# Patient Record
(demographics unavailable — no encounter records)

---

## 2022-06-30 RX ORDER — NORGESTREL AND ETHINYL ESTRADIOL 0.3-0.03MG
KIT ORAL
COMMUNITY
End: 2022-09-27

## 2022-06-30 RX ORDER — METRONIDAZOLE 500 MG/1
TABLET ORAL
COMMUNITY
End: 2022-09-27

## 2022-06-30 RX ORDER — TRAMADOL HYDROCHLORIDE 50 MG/1
TABLET ORAL
COMMUNITY
End: 2022-09-27

## 2022-07-28 LAB
ANION GAP SERPL CALC-SCNC: 10 MMOL/L (ref 2–17)
APPEARANCE UR: CLEAR
BACTERIA, URINE: ABNORMAL
BASOPHILS # BLD AUTO: 0 X10E3/MCL (ref 0–0.2)
BASOPHILS NFR BLD AUTO: 0.3 % (ref 0–2)
BILIRUB UR STRIP.AUTO-MCNC: NEGATIVE MG/DL
BUN SERPL-MCNC: 11 MG/DL (ref 6–20)
CALCIUM SERPL-MCNC: 9 MG/DL (ref 8.6–10)
CHLORIDE SERPL-SCNC: 104 MMOL/L (ref 98–107)
COLOR UR: YELLOW
CREAT SERPL-MCNC: 0.8 MG/DL (ref 0.5–1)
DEPRECATED HCO3 PLAS-SCNC: 24 MMOL/L (ref 22–29)
EOSINOPHIL # BLD AUTO: 0.1 X10E3/MCL (ref 0–0.5)
EOSINOPHIL NFR BLD AUTO: 1.2 % (ref 0–7)
ERYTHROCYTE [DISTWIDTH] IN BLOOD BY AUTOMATED COUNT: 19.3 % (ref 11–16)
GFR SERPL CREATININE-BSD FRML MDRD: 95 ML/MIN/1.73M²
GLUCOSE SERPL-MCNC: 109 MG/DL (ref 70–99)
GLUCOSE UR STRIP.AUTO-MCNC: NEGATIVE MG/DL
HCT VFR BLD AUTO: 28.7 % (ref 34–47)
HGB BLD-MCNC: 8.8 G/DL (ref 11.5–15.7)
IMMATURE GRANS (ABS): 0.02 X10E3/MCL (ref 0–0.06)
IMMATURE GRANULOCYTES: 0.2 % (ref 0–0.6)
KETONES UR STRIP.AUTO-MCNC: NEGATIVE MG/DL
LEUKOCYTE ESTERASE UR QL STRIP: ABNORMAL
LYMPHOCYTES # SPEC AUTO: 3.7 X10E3/MCL (ref 1–3.2)
LYMPHOCYTES NFR BLD AUTO: 33.1 % (ref 15–45)
MAGNESIUM SERPL-MCNC: 2.1 MG/DL (ref 1.6–2.6)
MCH RBC QN AUTO: 21 PG (ref 27–34.5)
MCHC RBC AUTO-ENTMCNC: 30.7 G/DL (ref 32–36)
MCV RBC AUTO: 68.5 FL (ref 81–99)
MONOCYTES # BLD AUTO: 1 X10E3/MCL (ref 0.3–1)
MONOCYTES NFR BLD AUTO: 8.5 % (ref 4–12)
MUCUS, URINE: ABNORMAL /LPF
NEUTROPHILS # BLD AUTO: 6.3 X10E3/MCL (ref 1.6–7.3)
NEUTROPHILS NFR BLD AUTO: 56.7 % (ref 42–74)
NITRITE UR QL STRIP.AUTO: NEGATIVE
OSMOLALITY SERPL CALC.SUM OF ELEC: 275 MOSM/KG (ref 270–287)
PH UR STRIP.AUTO: 6 [PH] (ref 4.5–8)
PLATELET # BLD AUTO: 517 X10E3/MCL (ref 140–440)
PMV BLD AUTO: 9.4 FL (ref 7.2–13.2)
POTASSIUM SERPL-SCNC: 3.7 MMOL/L (ref 3.5–5.3)
PROT UR QL STRIP: NEGATIVE
RBC # BLD AUTO: 4.19 X10E6/MCL (ref 3.6–5.2)
RBC # UR STRIP: NEGATIVE /UL
RBC URINE: ABNORMAL /HPF (ref 0–2)
SODIUM SERPL-SCNC: 138 MMOL/L (ref 135–145)
SP GR UR STRIP: >=1.03 (ref 1–1.03)
SQUAMOUS EPITHELIAL: ABNORMAL /LPF
UROBILINOGEN UR STRIP-MCNC: 1 EU/DL
WBC # BLD AUTO: 11.1 X10E3/MCL (ref 3.8–10.6)
WBC URINE: ABNORMAL /HPF (ref 0–2)

## 2022-07-30 LAB
FINAL REPORT: NORMAL
ORGANISM: NORMAL

## 2022-09-27 PROBLEM — R87.810 CERVICAL HIGH RISK HPV (HUMAN PAPILLOMAVIRUS) TEST POSITIVE: Status: ACTIVE | Noted: 2022-09-27

## 2022-09-27 PROBLEM — R20.0 ANESTHESIA OF SKIN: Status: ACTIVE | Noted: 2022-09-27

## 2022-09-27 PROBLEM — R20.2 PARESTHESIA OF SKIN: Status: ACTIVE | Noted: 2022-09-27

## 2022-09-27 PROBLEM — N93.9 ABNORMAL UTERINE BLEEDING: Status: ACTIVE | Noted: 2022-09-27

## 2022-09-27 PROBLEM — N15.9 INFECTION OF KIDNEY: Status: ACTIVE | Noted: 2022-09-27

## 2022-09-27 PROBLEM — R20.2 PARESTHESIA OF HAND, BILATERAL: Status: ACTIVE | Noted: 2022-09-27

## 2022-09-27 PROBLEM — N92.0 MENORRHAGIA WITH REGULAR CYCLE: Status: ACTIVE | Noted: 2022-09-27

## 2022-09-27 PROBLEM — G56.02 LEFT CARPAL TUNNEL SYNDROME: Status: ACTIVE | Noted: 2022-09-27

## 2022-09-27 PROBLEM — G56.01 RIGHT CARPAL TUNNEL SYNDROME: Status: ACTIVE | Noted: 2022-09-27

## 2022-09-27 PROBLEM — G56.21 ULNAR NEUROPATHY AT ELBOW OF RIGHT UPPER EXTREMITY: Status: ACTIVE | Noted: 2022-09-27

## 2022-10-23 NOTE — ED PROVIDER NOTES
General Medical Problem *ED        Patient:   Abundio Houston            MRN: [id number]            FIN: [id number]               Age:   36 years     Sex:  Female     :  1981   Associated Diagnoses:   Carpal tunnel syndrome, bilateral; Pain of left calf; Anemia   Author:   Vipul Pretty      Basic Information   Time seen: Provider Seen (ST)   ED Provider/Time:    Vipul Pretty / 2022 19:21  . Additional information: Chief Complaint from Nursing Triage Note   Chief Complaint  Chief Complaint: Pt c/o bilateral arm and leg swelling for past months, however worse now. Pt also c/o right breast pain, endorses swelling. Pt also c/o right middle finger pain since yesterday. Also reports frequent urination and back pain. Denies N/V/D, fevers/chills. (22 18:58:00). History of Present Illness   The patient presents with 3year-old -American female with a past medical history of bilateral carpal tunnel with concomitant peripheral neuropathy and follows with Dr. Naseem Powell coming in with left lower calf pain and swelling for the past 3 days after she landed from a plane flight from [place]. Month ago she was [facility] and had the same pain in her left calf. She denies chest pain, shortness of breath. No history of blood clots. She also notes urinary frequency with no dysuria that has been going on for the past 1-2 days. No fevers, back pain, flank pain. No trauma no falls with her back pain. .        Review of Systems   Constitutional symptoms:  No fever, no chills, no sweats, no fatigue. Skin symptoms:  No jaundice, no rash. Eye symptoms:  Vision unchanged. Respiratory symptoms:  No shortness of breath, no cough. Cardiovascular symptoms:  No chest pain, no palpitations, no peripheral edema. Gastrointestinal symptoms:  No nausea, no vomiting, no diarrhea. Genitourinary symptoms:  Urinary frequency. Musculoskeletal symptoms:  Muscle pain, Joint pain.     Neurologic symptoms:  Numbness, tingling, no headache, no focal weakness. Hematologic/Lymphatic symptoms:  Bleeding tendency negative,    Allergy/immunologic symptoms:  No impaired immunity,              Additional review of systems information: All other systems reviewed and otherwise negative. Health Status   Allergies: Allergic Reactions (Selected)  No Known Allergies. Past Medical/ Family/ Social History   Surgical history: Reviewed in chart and/or reviewed at bedside. .   Family history: Reviewed in chart and/or reviewed at bedside. .   Social history: Reviewed in chart and/or reviewed at bedside. .   Problem list:    No qualifying data available  , Reviewed in chart and/or reviewed at bedside. Robi Contreras Physical Examination               Vital Signs   Vital Signs   7/28/2022 19:37 EDT Respiratory Rate 16 br/min   6/70/3687 91:85 EDT Systolic Blood Pressure 579 mmHg  HI    Diastolic Blood Pressure 98 mmHg  HI    Temperature Oral 36.7 degC    Heart Rate Monitored 101 bpm  HI    Respiratory Rate 16 br/min    SpO2 100 %   . Measurements   7/28/2022 19:04 EDT Body Mass Index est raúl 37.68 kg/m2    Body Mass Index Measured 37.68 kg/m2   7/28/2022 18:58 EDT Height/Length Measured 163 cm    Weight Dosing 100.1 kg   . Oxygen saturation. General:  Alert, no acute distress. Skin:  Warm, dry, pink. Head:  Normocephalic, atraumatic. Neck:  Supple, trachea midline. Eye:  Extraocular movements are intact, normal conjunctiva, vision grossly normal.    Ears, nose, mouth and throat:  Oral mucosa moist.   Cardiovascular:  Regular rate and rhythm, No murmur, Normal peripheral perfusion. Respiratory:  Lungs are clear to auscultation, respirations are non-labored, breath sounds are equal, Symmetrical chest wall expansion. Back:  Normal range of motion.    Musculoskeletal:  Bilateral phalen and tinel sign to both wrists  Left lower extremity: Positive Homans' sign and tenderness to palpation of the left Discharged: to home. Prescriptions: Launch prescriptions   Pharmacy:  Keflex 500 mg oral capsule (Prescribe): 500 mg, 1 caps, Oral, BID, for 5 days, 10 caps, 0 Refill(s), Launch prescriptions   Pharmacy:  Diflucan 150 mg oral tablet (Prescribe): 150 mg, 1 tabs, Oral, qWeek, for 2 weeks, 2 tabs, 0 Refill(s). Patient was given the following educational materials: Carpal Tunnel Syndrome. Follow up with: [facility] Within 1 week; Follow up with primary care provider Within 1 week Please follow-up with her primary care provider and/or if you do not have one please call [phone] for a referral or see attached clinic list. - TO RECHECK YOUR HGB AS IT WAS 8.8 HERE IN ED. Counseled: Patient, Regarding diagnosis, Regarding diagnostic results, Regarding treatment plan, Regarding prescription, Patient indicated understanding of instructions.     Signature Line     Electronically Signed on 07/28/2022 08:46 PM EDT   ________________________________________________   Aida Dugan      Electronically Signed on 07/28/2022 10:44 PM EDT   ________________________________________________   Allegra Cheng            Modified by: Aida Dugan on 07/28/2022 08:35 PM EDT      Modified by: Aida Dugan on 07/28/2022 08:17 PM EDT      Modified by: Aida Dugan on 07/28/2022 08:43 PM EDT      Modified by: Aida Dugan on 07/28/2022 08:46 PM EDT

## 2022-10-23 NOTE — ED NOTES
ED Patient Education Note     Patient Education Materials Follows:  Orthopedics     Carpal Tunnel Syndrome      Carpal tunnel syndrome is a condition that causes pain, numbness, and weakness in your hand and fingers. The carpal tunnel is a narrow area located on the palm side of your wrist. Repeated wrist motion or certain diseases may cause swelling within the tunnel. This swelling pinches the main nerve in the wrist. The main nerve in the wrist is called the median nerve. What are the causes? This condition may be caused by:   Repeated and forceful wrist and hand motions. Wrist injuries. Arthritis. A cyst or tumor in the carpal tunnel. Fluid buildup during pregnancy. Use of tools that vibrate. Sometimes the cause of this condition is not known. What increases the risk? The following factors may make you more likely to develop this condition:   Having a job that requires you to repeatedly or forcefully move your wrist or hand or requires you to use tools that vibrate. This may include jobs that involve using computers, working on an [employer], or working with [facility] [address] such as drills or mariano. Being a woman. Having certain conditions, such as:  ? Diabetes. ? Obesity. ? An underactive thyroid (hypothyroidism). ? Kidney failure. ? Rheumatoid arthritis. What are the signs or symptoms? Symptoms of this condition include:   A tingling feeling in your fingers, especially in your thumb, index, and middle fingers. Tingling or numbness in your hand. An aching feeling in your entire arm, especially when your wrist and elbow are bent for a long time. Wrist pain that goes up your arm to your shoulder. Pain that goes down into your palm or fingers. A weak feeling in your hands. You may have trouble grabbing and holding items. Your symptoms may feel worse during the night. How is this diagnosed?     This condition is diagnosed with a medical history and physical exam. You may also have tests, including:   Electromyogram (EMG). This test measures electrical signals sent by your nerves into the muscles. Nerve conduction study. This test measures how well electrical signals pass through your nerves. Imaging tests, such as X-rays, ultrasound, and MRI. These tests check for possible causes of your condition. How is this treated? This condition may be treated with:   Lifestyle changes. It is important to stop or change the activity that caused your condition. Doing exercise and activities to strengthen and stretch your muscles and tendons (physical therapy). Making lifestyle changes to help with your condition and learning how to do your daily activities safely (occupational therapy). Medicines for pain and inflammation. This may include medicine that is injected into your wrist.     A wrist splint or brace. Surgery. Follow these instructions at home:    If you have a splint or brace:     Wear the splint or brace as told by your health care provider. Remove it only as told by your health care provider. Loosen the splint or brace if your fingers tingle, become numb, or turn cold and blue. Keep the splint or brace clean. If the splint or brace is not waterproof:  ? Do not let it get wet. ? Cover it with a watertight covering when you take a bath or shower. Managing pain, stiffness, and swelling      If directed, put ice on the painful area. To do this:   If you have a removeable splint or brace, remove it as told by your health care provider. Put ice in a plastic bag. Place a towel between your skin and the bag or between the splint or brace and the bag. Leave the ice on for 20 minutes, 2?3 times a day. Do not fall asleep with the cold pack on your skin. Remove the ice if your skin turns bright red. This is very important.  If you cannot feel pain, heat, or cold, you have a greater risk of damage to the area. Move your fingers often to reduce stiffness and swelling. General instructions     Take over-the-counter and prescription medicines only as told by your health care provider. Rest your wrist and hand from any activity that may be causing your pain. If your condition is work related, talk with your employer about changes that can be made, such as getting a wrist pad to use while typing. Do any exercises as told by your health care provider, physical therapist, or occupational therapist.     Candance Raider all follow-up visits. This is important. Contact a health care provider if:     You have new symptoms. Your pain is not controlled with medicines. Your symptoms get worse. Get help right away if:     You have severe numbness or tingling in your wrist or hand. Summary     Carpal tunnel syndrome is a condition that causes pain, numbness, and weakness in your hand and fingers. It is usually caused by repeated wrist motions. Lifestyle changes and medicines are used to treat carpal tunnel syndrome. Surgery may be recommended. Follow your health care provider's instructions about wearing a splint, resting from activity, keeping follow-up visits, and calling for help. This information is not intended to replace advice given to you by your health care provider. Make sure you discuss any questions you have with your health care provider. Document Revised: 04/29/2021 Document Reviewed: 04/29/2021  [employer] Patient Education ?  [address] [place].

## 2022-10-23 NOTE — ED NOTES
ED Triage Note       ED Secondary Triage Entered On:  7/28/2022 19:24 EDT    Performed On:  7/28/2022 19:23 EDT by Jacob Villar               General Information   Barriers to Learning :   None evident   ED Home Meds Section :   Document assessment   [facility] ED Fall Risk Section :   Document assessment   ED Advance Directives Section :   Document assessment   ED Palliative Screen :   N/A (prefilled for <66yo)   Jacob Villar - 7/28/2022 19:23 EDT   (As Of: 7/28/2022 19:24:14 EDT)   Diagnoses(Active)    Multiple complaints  Date:   7/28/2022 ; Diagnosis Type:   Reason For Visit ; Confirmation:   Complaint of ; Clinical Dx:   Multiple complaints ; Classification:   Medical ; Clinical Service:   Emergency medicine ; Code:   PNED ; Probability:   0 ; Diagnosis Code:   [id number]             -    Procedure History   (As Of: 7/28/2022 19:24:14 EDT)     [facility] Fall Risk Assessment Tool   Hx of falling last 3 months ED Fall :   No   Jacob Villar - 7/28/2022 19:23 EDT   ED Advance Directive   Advance Directive :   No   Jacob Villar - 7/28/2022 19:23 EDT   Med Hx   Medication List   (As Of: 7/28/2022 19:24:14 EDT)

## 2022-10-23 NOTE — ED NOTES
Of: 7/28/2022 19:04:52 EDT)   Allergies (Active)   No Known Allergies  Estimated Onset Date:   Unspecified ; Created By:   Opal Garay RN, Aida Merino; Reaction Status:   Active ; Category:   Drug ; Substance:   No Known Allergies ; Type:    Allergy ; Updated By:   Opal Garay RN, Aida Merino; Reviewed Date:   7/28/2022 19:02 EDT        Psycho-Social   Last 3 mo, thoughts killing self/others :   Patient denies   Right click within box for Suspected Abuse policy link. :   None   Feels Safe Where Live :   Yes   ED Behavioral Activity Rating Scale :   4 - Quiet and awake (normal level of activity)   Opal Garay RN, Aida Merino - 7/28/2022 18:58 EDT   ED Reason for Visit   (As Of: 7/28/2022 19:04:52 EDT)   Diagnoses(Active)    Multiple complaints  Date:   7/28/2022 ; Diagnosis Type:   Reason For Visit ; Confirmation:   Complaint of ; Clinical Dx:   Multiple complaints ; Classification:   Medical ; Clinical Service:   Emergency medicine ; Code:   PNED ; Probability:   0 ; Diagnosis Code:   [id number]

## 2022-10-23 NOTE — DISCHARGE SUMMARY
ED Clinical Summary                         [facility]  [address], [address] [phone]           PERSON INFORMATION  Name: Aguila Montaño Age:  36 Years : 1981   Sex: Female Language: English PCP: PCP,  NONE   Marital Status:   Phone: [phone] Med Service: MED-Medicine   MRN:  [id number] Acct# [de-identified] Arrival: 2022 18:55:00   Visit Reason: Multiple complaints; MUTILPLE COMPLAINTS Acuity: 3 LOS: 000 04:58   Address:      [address] [place] [address]  Diagnosis:      Anemia; Carpal tunnel syndrome, bilateral; Pain of left calf  Printed Prescriptions: Allergies      No Known Allergies      Medications Administered During Visit:        Patient Medication List:              cephalexin (Keflex 500 mg oral capsule) 1 Capsules Oral (given by mouth) 2 times a day for 5 Days. Refills: 0.  fluconazole (Diflucan 150 mg oral tablet) 1 Tabs Oral (given by mouth) every week for 2 Weeks. Refills: 0. Major Tests and Procedures: The following procedures and tests were performed during your ED visit. COMMONPROCEDURES%>  COMMON PROCEDURESCOMMENTS%>          Laboratory Orders  Name Status Details   . UA Micro Completed Urine, Clean Catch, Stat, ST - Stat, Collected, 22 19:51:00 EDT, Once, 22 19:51:00 EDT, Nurse collect, 22 19:51:00 EDT, Leonidas Avendaño, Print label Y/N, [id number]   BMP Completed Blood, Stat, ST - Stat, 22 20:07:00 EDT, 22 20:07:00 EDT, Nurse collect, Leonidas Avendaño, Print label Y/N   C Urine Ordered Urine, Clean Catch, Stat, ST - Stat, 22 19:40:00 EDT, 22 19:40:00 EDT, Nurse collect   CBCDIFF Completed Blood, Stat, ST - Stat, 22 19:40:00 EDT, 22 19:40:00 EDT, Nurse collectLeonidas, Print label Y/N   Mg Completed Blood, Stat, ST - Stat, 22 20:07:00 EDT, 22 20:07:00 EDT, Nurse collect, Cosme Saunders, MANI-PAC, Print label Y/N   UA Rflx Tre Completed Urine, Clean Catch, Stat, ST - Stat, 07/28/22 19:40:00 EDT, Once, 07/28/22 19:40:00 EDT, Nurse collectPablo, Print label Y/N               Radiology Orders  No radiology orders were placed.               Patient Care Orders  Name Status Details   Discharge Patient Ordered 07/28/22 20:36:00 EDT   ED Assessment Adult Completed 07/28/22 19:04:53 EDT, 07/28/22 19:04:53 EDT   ED Secondary Triage Completed 07/28/22 19:04:53 EDT, 07/28/22 19:04:53 EDT   ED Triage Adult Completed 07/28/22 18:55:46 EDT, 07/28/22 18:55:46 EDT             PROVIDER INFORMATION               Provider Role Assigned Rishi [facility] - INPATIENT ED MidLevel 7/28/2022 19:21:47    Gabriele Mclaughlin ED Nurse 7/28/2022 19:23:00        Attending Physician:  Melisa Bates Doc  REEVEGIOVANNY     Consulting Doc       VITALS INFORMATION  Vital Sign Triage Latest   Temp Oral ORAL_1%>36.7 degC ORAL%>36.7 degC   Temp Temporal TEMPORAL_1%> TEMPORAL%>   Temp Intravascular INTRAVASCULAR_1%> INTRAVASCULAR%>   Temp Axillary AXILLARY_1%> AXILLARY%>   Temp Rectal RECTAL_1%> RECTAL%>   02 Sat 100 % 100 %   Respiratory Rate RATE_1%>16 br/min RATE%>16 br/min   Peripheral Pulse Rate PULSE RATE_1%> PULSE RATE%>   Apical Heart Rate HEART RATE_1%> HEART RATE%>   Blood Pressure BLOOD PRESSURE_1%>/ BLOOD PRESSURE_1%>98 mmHg BLOOD PRESSURE%>139 mmHg / BLOOD PRESSURE%>82 mmHg                 Immunizations      No Immunizations Documented This Visit          DISCHARGE INFORMATION   Discharge Disposition: H Outpt-Sent Home   Discharge Location:    Home   Discharge Date and Time:    7/28/2022 23:53:04   ED Checkout Date and Time:    7/28/2022 23:53:04     DEPART REASON INCOMPLETE INFORMATION               Depart Action Incomplete Reason   Interactive View/I&O Recently assessed               Problems      No Problems Documented              Smoking Status      No Smoking Status Documented         PATIENT EDUCATION INFORMATION  Instructions:       Carpal Tunnel Syndrome     Follow up:                    With: Address: When:   Follow up with primary care provider  Within 1 week   Comments:   Please follow-up with her primary care provider and/or if you do not have one please call [phone] for a referral or see attached clinic list. - TO RECHECK YOUR HGB AS IT WAS 8.8 HERE IN ED       With: Address: When:   FRANKIE JASMYNE [facility] [address] [place] [address] [address] [place] [address], [place] [address], [address] [place] [address]  [phone] Business (1) Within 1 week           ED PROVIDER DOCUMENTATION     Patient:   Fracisco Broussard            MRN: [id number]            FIN: [id number]               Age:   [address] years     Sex:  Female     :  1981   Associated Diagnoses:   Carpal tunnel syndrome, bilateral; Pain of left calf; Anemia   Author:   Jean Claude Ayala      Basic Information   Time seen: Provider Seen (ST)   ED Provider/Time:    Jean Claude Ayala / 2022 19:21  . Additional information: Chief Complaint from Nursing Triage Note   Chief Complaint  Chief Complaint: Pt c/o bilateral arm and leg swelling for past months, however worse now. Pt also c/o right breast pain, endorses swelling. Pt also c/o right middle finger pain since yesterday. Also reports frequent urination and back pain. Denies N/V/D, fevers/chills. (22 18:58:00). History of Present Illness   The patient presents with 3year-old -American female with a past medical history of bilateral carpal tunnel with concomitant peripheral neuropathy and follows with Dr. Luke Mcpherson coming in with left lower calf pain and swelling for the past 3 days after she landed from a plane flight from [place]. Month ago she was [facility] and had the same pain in her left calf. She denies chest pain, shortness of breath. No history of blood clots. She also notes urinary frequency with no dysuria that has been going on for the past 1-2 days.   No fevers, back pain, flank pain. No trauma no falls with her back pain. .        Review of Systems   Constitutional symptoms:  No fever, no chills, no sweats, no fatigue. Skin symptoms:  No jaundice, no rash. Eye symptoms:  Vision unchanged. Respiratory symptoms:  No shortness of breath, no cough. Cardiovascular symptoms:  No chest pain, no palpitations, no peripheral edema. Gastrointestinal symptoms:  No nausea, no vomiting, no diarrhea. Genitourinary symptoms:  Urinary frequency. Musculoskeletal symptoms:  Muscle pain, Joint pain. Neurologic symptoms:  Numbness, tingling, no headache, no focal weakness. Hematologic/Lymphatic symptoms:  Bleeding tendency negative,    Allergy/immunologic symptoms:  No impaired immunity,              Additional review of systems information: All other systems reviewed and otherwise negative. Health Status   Allergies: Allergic Reactions (Selected)  No Known Allergies. Past Medical/ Family/ Social History   Surgical history: Reviewed in chart and/or reviewed at bedside. .   Family history: Reviewed in chart and/or reviewed at bedside. .   Social history: Reviewed in chart and/or reviewed at bedside. .   Problem list:    No qualifying data available  , Reviewed in chart and/or reviewed at bedside. Robi Contreras Physical Examination               Vital Signs   Vital Signs   7/28/2022 19:37 EDT Respiratory Rate 16 br/min   1/93/4514 73:29 EDT Systolic Blood Pressure 721 mmHg  HI    Diastolic Blood Pressure 98 mmHg  HI    Temperature Oral 36.7 degC    Heart Rate Monitored 101 bpm  HI    Respiratory Rate 16 br/min    SpO2 100 %   . Measurements   7/28/2022 19:04 EDT Body Mass Index est raúl 37.68 kg/m2    Body Mass Index Measured 37.68 kg/m2   7/28/2022 18:58 EDT Height/Length Measured 163 cm    Weight Dosing 100.1 kg   . Oxygen saturation. General:  Alert, no acute distress. Skin:  Warm, dry, pink. Head:  Normocephalic, atraumatic.     Neck:  Supple, trachea midline. Eye:  Extraocular movements are intact, normal conjunctiva, vision grossly normal.    Ears, nose, mouth and throat:  Oral mucosa moist.   Cardiovascular:  Regular rate and rhythm, No murmur, Normal peripheral perfusion. Respiratory:  Lungs are clear to auscultation, respirations are non-labored, breath sounds are equal, Symmetrical chest wall expansion. Back:  Normal range of motion. Musculoskeletal:  Bilateral phalen and tinel sign to both wrists  Left lower extremity: Positive Homans' sign and tenderness to palpation of the left calf with no obvious redness, no unilateral swelling. Chest wall   Gastrointestinal:  Soft, Nontender, Non distended. Genitourinary   Neurological:  Alert and oriented to person, place, time, and situation, normal speech observed. Lymphatics   Psychiatric:  Cooperative, appropriate mood & affect. Medical Decision Making   Differential Diagnosis:  Abdominal pain, dehydration, electrolyte imbalance. Rationale:  49-year-old -American female coming in with a left calf pain and swelling that started 3 days ago after she got off a plane from [place] with no chest pain or shortness of breath nor history of DVTs and urinary frequency for 1 to 2 days with no other symptomology. We will send off urine, basic labs for the other muscle spasms/cramps otherwise she has a known history of neuropathy from her bilateral carpal tunnel and follows with Dr. Naseem Powell. Labs are otherwise reassuring other than a hemoglobin 8.8. She states that she supposed be taking iron pills but she has not taken them in awhile. She states that she has heavy menstrual cycles and supposed to scheduling her hysterectomy but has not. No active bleeding at this time. Denies melena, hematochezia. she states that her last menstrual cycle was only 5 Diflucan per patient request that she does get yeast infections days in length. I told her to start taking her iron pills as prescribed. Otherwise no electrolyte abnormalities. Urine shows mild urinary tract infection for which I will treat. Urine culture sent. Told to come back tomorrow for DVT ultrasound of her left lower extremity. Again no chest pain or shortness of breath at this time. .      Impression and Plan   Diagnosis   Carpal tunnel syndrome, bilateral (YZA46-UJ G56.03, Discharge, Medical)   Pain of left calf (CAY31-IH M79.662, Discharge, Medical)   Anemia (OIV58-KW D64.9, Discharge, Medical)   Plan   Condition: Improved, Stable. Disposition: Medically cleared, Discharged: to home. Prescriptions: Launch prescriptions   Pharmacy:  Keflex 500 mg oral capsule (Prescribe): 500 mg, 1 caps, Oral, BID, for 5 days, 10 caps, 0 Refill(s), Launch prescriptions   Pharmacy:  Diflucan 150 mg oral tablet (Prescribe): 150 mg, 1 tabs, Oral, qWeek, for 2 weeks, 2 tabs, 0 Refill(s). Patient was given the following educational materials: Carpal Tunnel Syndrome. Follow up with: Tita Davis Within 1 week; Follow up with primary care provider Within 1 week Please follow-up with her primary care provider and/or if you do not have one please call [phone] for a referral or see attached clinic list. - TO RECHECK YOUR HGB AS IT WAS 8.8 HERE IN ED. Counseled: Patient, Regarding diagnosis, Regarding diagnostic results, Regarding treatment plan, Regarding prescription, Patient indicated understanding of instructions.

## 2022-10-23 NOTE — ED NOTES
ED Patient Summary       ;          [facility] [place]  [address], [place], [address] [place] [address]  [phone]  Discharge Instructions (Patient)  Bro Soto  :  1981                   MRN: [id number]                   FIN: [id number]  Reason For Visit: Multiple complaints; Tiney Effie COMPLAINTS  Final Diagnosis: Anemia; Carpal tunnel syndrome, bilateral; Pain of left calf     Visit Date: 2022 18:55:00  Address: [place] [address] [address]  Phone: [phone]     Emergency Department Providers:         Primary Physician:      Juanjose Ann Dr would like to thank you for allowing us to assist you with your healthcare needs. The following includes patient education materials and information regarding your injury/illness. Follow-up Instructions: You were seen today on an emergency basis. Please contact your primary care doctor for a follow up appointment. If you received a referral to a specialist doctor, it is important you follow-up as instructed. It is important that you call your follow-up doctor to schedule and confirm the location of your next appointment. Your doctor may practice at multiple locations. The office location of your follow-up appointment may be different to the one written on your discharge instructions. If you do not have a primary care doctor, please call [phone] for help in finding a [facility] Provider. For help in finding a specialist doctor, please call [phone]. If your condition gets worse before your follow-up with your primary care doctor or specialist, please return to the Emergency Department. Coronavirus 2019 (COVID-19) Reminders:     Patients age 15 - 24, with parental consent, and over age 25 can make an appointment for a COVID-19 vaccine.  Patients can contact their [facility] doctors' offices to schedule an appointment to receive the COVID-19 vaccine. Patients who do not have a Kylah Mckeon physician can call [phone] to schedule vaccination appointments. Follow Up Appointments:  Primary Care Provider:     Name: PCP,  NONE     Phone:                  With: Address: When:   Follow up with primary care provider  Within 1 week   Comments:   Please follow-up with her primary care provider and/or if you do not have one please call [phone] for a referral or see attached clinic list. - TO RECHECK YOUR HGB AS IT WAS 8.8 HERE IN ED       With: Address: When:   [facility] [address] [facility], [address]  [phone] Business (1) Within 1 week              [address]  SERVICES%>             New Medications  Printed Prescriptions  cephalexin (Keflex 500 mg oral capsule) 1 Capsules Oral (given by mouth) 2 times a day for 5 Days. Refills: 0. Last Dose:____________________  fluconazole (Diflucan 150 mg oral tablet) 1 Tabs Oral (given by mouth) every week for 2 Weeks. Refills: 0. Last Dose:____________________      Allergy Info: No Known Allergies     Discharge Additional Information          Discharge Patient 07/28/22 20:36:00 EDT      Patient Education Materials:        Carpal Tunnel Syndrome      Carpal tunnel syndrome is a condition that causes pain, numbness, and weakness in your hand and fingers. The carpal tunnel is a narrow area located on the palm side of your wrist. Repeated wrist motion or certain diseases may cause swelling within the tunnel. This swelling pinches the main nerve in the wrist. The main nerve in the wrist is called the median nerve. What are the causes? This condition may be caused by:   Repeated and forceful wrist and hand motions. Wrist injuries. Arthritis. A cyst or tumor in the carpal tunnel. Fluid buildup during pregnancy. Use of tools that vibrate. Sometimes the cause of this condition is not known. What increases the risk? The following factors may make you more likely to develop this condition:   Having a job that requires you to repeatedly or forcefully move your wrist or hand or requires you to use tools that vibrate. This may include jobs that involve using computers, working on an [employer], or working with [address] [place] [address] [place] such as drills or mariano. Being a woman. Having certain conditions, such as:  ? Diabetes. ? Obesity. ? An underactive thyroid (hypothyroidism). ? Kidney failure. ? Rheumatoid arthritis. What are the signs or symptoms? Symptoms of this condition include:   A tingling feeling in your fingers, especially in your thumb, index, and middle fingers. Tingling or numbness in your hand. An aching feeling in your entire arm, especially when your wrist and elbow are bent for a long time. Wrist pain that goes up your arm to your shoulder. Pain that goes down into your palm or fingers. A weak feeling in your hands. You may have trouble grabbing and holding items. Your symptoms may feel worse during the night. How is this diagnosed? This condition is diagnosed with a medical history and physical exam. You may also have tests, including:   Electromyogram (EMG). This test measures electrical signals sent by your nerves into the muscles. Nerve conduction study. This test measures how well electrical signals pass through your nerves. Imaging tests, such as X-rays, ultrasound, and MRI. These tests check for possible causes of your condition. How is this treated? This condition may be treated with:   Lifestyle changes. It is important to stop or change the activity that caused your condition. Doing exercise and activities to strengthen and stretch your muscles and tendons (physical therapy).      Making lifestyle changes to help with your condition and learning how to do your daily activities safely (occupational therapy). Medicines for pain and inflammation. This may include medicine that is injected into your wrist.     A wrist splint or brace. Surgery. Follow these instructions at home:    If you have a splint or brace:     Wear the splint or brace as told by your health care provider. Remove it only as told by your health care provider. Loosen the splint or brace if your fingers tingle, become numb, or turn cold and blue. Keep the splint or brace clean. If the splint or brace is not waterproof:  ? Do not let it get wet. ? Cover it with a watertight covering when you take a bath or shower. Managing pain, stiffness, and swelling      If directed, put ice on the painful area. To do this:   If you have a removeable splint or brace, remove it as told by your health care provider. Put ice in a plastic bag. Place a towel between your skin and the bag or between the splint or brace and the bag. Leave the ice on for 20 minutes, 2?3 times a day. Do not fall asleep with the cold pack on your skin. Remove the ice if your skin turns bright red. This is very important. If you cannot feel pain, heat, or cold, you have a greater risk of damage to the area. Move your fingers often to reduce stiffness and swelling. General instructions     Take over-the-counter and prescription medicines only as told by your health care provider. Rest your wrist and hand from any activity that may be causing your pain. If your condition is work related, talk with your employer about changes that can be made, such as getting a wrist pad to use while typing. Do any exercises as told by your health care provider, physical therapist, or occupational therapist.     Vince Dawkins all follow-up visits. This is important. Contact a health care provider if:     You have new symptoms. Your pain is not controlled with medicines. Your symptoms get worse.       Get help right away if:     You have severe numbness or tingling in your wrist or hand. Summary     Carpal tunnel syndrome is a condition that causes pain, numbness, and weakness in your hand and fingers. It is usually caused by repeated wrist motions. Lifestyle changes and medicines are used to treat carpal tunnel syndrome. Surgery may be recommended. Follow your health care provider's instructions about wearing a splint, resting from activity, keeping follow-up visits, and calling for help. This information is not intended to replace advice given to you by your health care provider. Make sure you discuss any questions you have with your health care provider. Document Revised: 04/29/2021 Document Reviewed: 04/29/2021  [employer] Patient Education ? [id number] [address]      ---------------------------------------------------------------------------------------------------------------------  [facility] allows patients to review your COVID and other test results as well as discharge documents from any [facility], Emergency Department, surgical center or outpatient lab. Test results are typically available 36 hours after the test is completed. [address] encourages you to self-enroll in the [facility] Patient Portal.     To begin your self-enrollment process, please visit [email or web address] Under [facility], click on Sign up now. NOTE: You must be 16 years and older to use [facility] Self-Enroll online. If you are a parent, caregiver, or guardian; you need an invite to access your childs or dependents health records. To obtain an invite, contact the Medical Records department at [phone] Monday through Friday, 8-4:30, select option 3 . If we receive your call afterhours, we will return your call the next business day.      If you have issues trying to create or access your account, contact [email or web address] at [phone] available 7 days a week 24 hours a day.      Comment:

## 2022-11-05 NOTE — ED NOTES
ED Results Callback Log     Urine Culture  Collected: 07/28/2022 EC  Complete Body site:   Specimen Type: U CleanCatch 07/31/2022 09:13      07/31/2022 10:04 Yenny Jones RN, Breanne OBREGON) No further action required positive urine culture, sensitive to Cefazolin, prescribed Keflex.